# Patient Record
Sex: FEMALE | Race: WHITE | NOT HISPANIC OR LATINO | Employment: FULL TIME | URBAN - METROPOLITAN AREA
[De-identification: names, ages, dates, MRNs, and addresses within clinical notes are randomized per-mention and may not be internally consistent; named-entity substitution may affect disease eponyms.]

---

## 2017-04-24 ENCOUNTER — HOSPITAL ENCOUNTER (OUTPATIENT)
Dept: RADIOLOGY | Facility: MEDICAL CENTER | Age: 32
End: 2017-04-24
Attending: NURSE PRACTITIONER
Payer: COMMERCIAL

## 2017-04-24 DIAGNOSIS — N63.0 LUMP OR MASS IN BREAST: ICD-10-CM

## 2017-04-24 PROCEDURE — 76642 ULTRASOUND BREAST LIMITED: CPT | Mod: LT

## 2017-04-24 PROCEDURE — G0204 DX MAMMO INCL CAD BI: HCPCS

## 2019-08-05 ENCOUNTER — OFFICE VISIT (OUTPATIENT)
Dept: URGENT CARE | Facility: PHYSICIAN GROUP | Age: 34
End: 2019-08-05
Payer: COMMERCIAL

## 2019-08-05 VITALS
WEIGHT: 140 LBS | TEMPERATURE: 98.1 F | SYSTOLIC BLOOD PRESSURE: 110 MMHG | DIASTOLIC BLOOD PRESSURE: 78 MMHG | HEART RATE: 90 BPM | OXYGEN SATURATION: 97 % | RESPIRATION RATE: 16 BRPM
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
## 2019-08-05 PROCEDURE — 99203 OFFICE O/P NEW LOW 30 MIN: CPT | Performed by: PHYSICIAN ASSISTANT

## 2019-08-05 RX ORDER — SULFAMETHOXAZOLE AND TRIMETHOPRIM 800; 160 MG/1; MG/1
1 TABLET ORAL 2 TIMES DAILY
Qty: 14 TAB | Refills: 0 | Status: SHIPPED | OUTPATIENT
Start: 2019-08-05 | End: 2019-08-12

## 2019-08-05 RX ORDER — TRIAMCINOLONE ACETONIDE 1 MG/G
CREAM TOPICAL
Qty: 1 TUBE | Refills: 0 | Status: SHIPPED | OUTPATIENT
Start: 2019-08-05

## 2019-08-05 SDOH — HEALTH STABILITY: MENTAL HEALTH: HOW MANY STANDARD DRINKS CONTAINING ALCOHOL DO YOU HAVE ON A TYPICAL DAY?: 1 OR 2

## 2019-08-05 SDOH — HEALTH STABILITY: MENTAL HEALTH: HOW OFTEN DO YOU HAVE A DRINK CONTAINING ALCOHOL?: MONTHLY OR LESS

## 2019-08-05 ASSESSMENT — ENCOUNTER SYMPTOMS
FEVER: 0
COUGH: 0
PALPITATIONS: 0
SHORTNESS OF BREATH: 0

## 2019-08-05 ASSESSMENT — PAIN SCALES - GENERAL: PAINLEVEL: 3=SLIGHT PAIN

## 2019-08-06 NOTE — PROGRESS NOTES
Subjective:      Ilda Snowden is a 34 y.o. female who presents with Insect Bite (R ankle )            Rash   This is a new problem. The current episode started in the past 7 days. The problem has been gradually improving since onset. Location: right ankle. The rash is characterized by blistering, redness, itchiness and pain. She was exposed to an insect bite/sting. Pertinent negatives include no cough, fever or shortness of breath. Past treatments include anti-itch cream. The treatment provided no relief.       Review of Systems   Constitutional: Negative for fever and malaise/fatigue.   Respiratory: Negative for cough and shortness of breath.    Cardiovascular: Negative for chest pain and palpitations.   Skin: Positive for itching and rash.   All other systems reviewed and are negative.     PMH:  has no past medical history on file.  MEDS:   Current Outpatient Medications:   •  sulfamethoxazole-trimethoprim (BACTRIM DS) 800-160 MG tablet, Take 1 Tab by mouth 2 times a day for 7 days., Disp: 14 Tab, Rfl: 0  •  triamcinolone acetonide (KENALOG) 0.1 % Cream, Apply to affected area twice daily for 10 days, Disp: 1 Tube, Rfl: 0  •  albuterol (VENTOLIN OR PROVENTIL) 108 (90 BASE) MCG/ACT AERS, Inhale 2 Puffs by mouth every 6 hours as needed for Shortness of Breath., Disp: 1 Inhaler, Rfl: 3  ALLERGIES: No Known Allergies  SURGHX: History reviewed. No pertinent surgical history.  SOCHX:  reports that she has never smoked. She has never used smokeless tobacco. She reports that she drinks about 1.8 oz of alcohol per week. She reports that she does not use drugs.  FH: Family history was reviewed, no pertinent findings to report       Objective:     /78   Pulse 90   Temp 36.7 °C (98.1 °F) (Temporal)   Resp 16   Wt 63.5 kg (140 lb)   SpO2 97%      Physical Exam   Constitutional: She appears well-developed and well-nourished.   Cardiovascular: Normal rate, regular rhythm and normal heart sounds.    Pulmonary/Chest: Effort normal and breath sounds normal.   Skin: Skin is warm and dry. Rash noted. There is erythema.   3 cm area of indurated erythema of medial right ankle.   Psychiatric: She has a normal mood and affect. Her behavior is normal. Judgment and thought content normal.   Vitals reviewed.              Assessment/Plan:     1. Bug bite with infection, initial encounter    - sulfamethoxazole-trimethoprim (BACTRIM DS) 800-160 MG tablet; Take 1 Tab by mouth 2 times a day for 7 days.  Dispense: 14 Tab; Refill: 0  - triamcinolone acetonide (KENALOG) 0.1 % Cream; Apply to affected area twice daily for 10 days  Dispense: 1 Tube; Refill: 0    Differential diagnosis, natural history, supportive care discussed. Follow-up with primary care provider within 7-10 days, emergency room precautions discussed.  Patient and/or family appears understanding of information.  Handout and review of patients diagnosis and treatment was discussed extensively.

## 2022-04-20 ENCOUNTER — OFFICE VISIT (OUTPATIENT)
Dept: URGENT CARE | Facility: PHYSICIAN GROUP | Age: 37
End: 2022-04-20
Payer: COMMERCIAL

## 2022-04-20 VITALS
TEMPERATURE: 97 F | DIASTOLIC BLOOD PRESSURE: 86 MMHG | HEIGHT: 64 IN | WEIGHT: 124 LBS | HEART RATE: 98 BPM | BODY MASS INDEX: 21.17 KG/M2 | RESPIRATION RATE: 12 BRPM | OXYGEN SATURATION: 99 % | SYSTOLIC BLOOD PRESSURE: 126 MMHG

## 2022-04-20 DIAGNOSIS — K04.7 DENTAL INFECTION: ICD-10-CM

## 2022-04-20 PROCEDURE — 99213 OFFICE O/P EST LOW 20 MIN: CPT | Performed by: NURSE PRACTITIONER

## 2022-04-20 RX ORDER — ACETAMINOPHEN 325 MG/1
650 TABLET ORAL EVERY 4 HOURS PRN
COMMUNITY

## 2022-04-20 RX ORDER — CHLORHEXIDINE GLUCONATE ORAL RINSE 1.2 MG/ML
5 SOLUTION DENTAL 2 TIMES DAILY
Qty: 118 ML | Refills: 0 | Status: SHIPPED | OUTPATIENT
Start: 2022-04-20 | End: 2022-04-27

## 2022-04-20 RX ORDER — IBUPROFEN 400 MG/1
400 TABLET ORAL EVERY 6 HOURS PRN
COMMUNITY

## 2022-04-20 RX ORDER — AMOXICILLIN 500 MG/1
500 CAPSULE ORAL 2 TIMES DAILY
Qty: 14 CAPSULE | Refills: 0 | Status: SHIPPED | OUTPATIENT
Start: 2022-04-20 | End: 2022-04-27

## 2022-04-20 ASSESSMENT — ENCOUNTER SYMPTOMS
HEADACHES: 0
NAUSEA: 0
VOMITING: 0
CHILLS: 0
FEVER: 0
MYALGIAS: 1

## 2022-04-20 ASSESSMENT — PAIN SCALES - GENERAL: PAINLEVEL: 10=SEVERE PAIN

## 2022-04-20 NOTE — PROGRESS NOTES
"Subjective:     Ilda Snowden is a 37 y.o. female who presents for Dental Pain (\"Has an apt with dentist next week\")      HPI  Pt presents for evaluation of a new problem. Ilda is a pleasant 37-year-old female presents to urgent care today with complaints of right upper posterior gum pain that has been ongoing for the past 4 weeks.  She notes that this pain started approximately 1 week after having her dental cleaning.  During her last dental cleaning she notes that the dental hygienist did \" gouge\" her back gum where this pain is now presenting.  Her pain is intermittent and will last approximately 15 to 20 minutes.  This pain is sharp and radiates up and down gumline before dissipating.  She has been alternating between Tylenol and ibuprofen however, this does not provide any relief of her discomfort.  Her pain gets up to an 8 out of 10.  She denies any fever, chills, drainage, nausea or vomiting.  She also denies any increased sensitivity to hot and cold.    Review of Systems   Constitutional: Negative for chills and fever.   Gastrointestinal: Negative for nausea and vomiting.   Musculoskeletal: Positive for myalgias.   Neurological: Negative for headaches.       PMH: History reviewed. No pertinent past medical history.  ALLERGIES: No Known Allergies  SURGHX: History reviewed. No pertinent surgical history.  SOCHX:   Social History     Socioeconomic History   • Marital status:    Tobacco Use   • Smoking status: Never Smoker   • Smokeless tobacco: Never Used   Substance and Sexual Activity   • Alcohol use: Yes     Alcohol/week: 1.8 oz     Types: 1 Cans of beer, 1 Shots of liquor, 1 Glasses of wine per week   • Drug use: Never     FH: History reviewed. No pertinent family history.      Objective:   /86   Pulse 98   Temp 36.1 °C (97 °F)   Resp 12   Ht 1.626 m (5' 4\")   Wt 56.2 kg (124 lb) Comment: wth shoes  SpO2 99%   BMI 21.28 kg/m²     Physical Exam  Vitals and nursing note " reviewed.   Constitutional:       General: She is not in acute distress.     Appearance: Normal appearance. She is not ill-appearing.   HENT:      Head: Normocephalic and atraumatic.      Right Ear: External ear normal.      Left Ear: External ear normal.      Nose: No congestion or rhinorrhea.      Mouth/Throat:      Mouth: Mucous membranes are moist.      Dentition: Does not have dentures. Dental tenderness and gingival swelling present. No dental caries, dental abscesses or gum lesions.        Comments: Positive for erythema and swelling of right second and third molar.   Eyes:      Extraocular Movements: Extraocular movements intact.      Pupils: Pupils are equal, round, and reactive to light.   Cardiovascular:      Rate and Rhythm: Normal rate and regular rhythm.      Pulses: Normal pulses.      Heart sounds: Normal heart sounds.   Pulmonary:      Effort: Pulmonary effort is normal. No respiratory distress.      Breath sounds: Normal breath sounds. No stridor. No wheezing, rhonchi or rales.   Chest:      Chest wall: No tenderness.   Abdominal:      General: Abdomen is flat. Bowel sounds are normal.      Palpations: Abdomen is soft.      Tenderness: There is no abdominal tenderness. There is no right CVA tenderness or left CVA tenderness.   Musculoskeletal:         General: Normal range of motion.      Cervical back: Normal range of motion and neck supple.   Skin:     General: Skin is warm and dry.      Capillary Refill: Capillary refill takes less than 2 seconds.   Neurological:      General: No focal deficit present.      Mental Status: She is alert and oriented to person, place, and time. Mental status is at baseline.   Psychiatric:         Mood and Affect: Mood normal.         Behavior: Behavior normal.         Thought Content: Thought content normal.         Judgment: Judgment normal.         Assessment/Plan:   Assessment    1. Dental infection  amoxicillin (AMOXIL) 500 MG Cap    chlorhexidine (PERIDEX)  0.12 % Solution     She will be treated for dental infection with amoxicillin and Peridex mouth solution.  Patient does have follow-up appointment with dentist in 7 days.  Continue with over-the-counter ibuprofen and Tylenol for relief of pain.  She may also use Orajel topically.  Follow-up for worsening or persistent symptoms.  AVS handout given and reviewed with patient. Pt educated on red flags and when to seek treatment back in ER or UC.

## 2023-07-22 ENCOUNTER — OFFICE VISIT (OUTPATIENT)
Dept: URGENT CARE | Facility: PHYSICIAN GROUP | Age: 38
End: 2023-07-22
Payer: COMMERCIAL

## 2023-07-22 VITALS
TEMPERATURE: 98.2 F | OXYGEN SATURATION: 96 % | DIASTOLIC BLOOD PRESSURE: 82 MMHG | HEART RATE: 69 BPM | HEIGHT: 63 IN | BODY MASS INDEX: 23.35 KG/M2 | RESPIRATION RATE: 14 BRPM | WEIGHT: 131.8 LBS | SYSTOLIC BLOOD PRESSURE: 118 MMHG

## 2023-07-22 DIAGNOSIS — J02.9 SORE THROAT: ICD-10-CM

## 2023-07-22 DIAGNOSIS — R59.1 LYMPHADENOPATHY: ICD-10-CM

## 2023-07-22 DIAGNOSIS — H60.502 ACUTE OTITIS EXTERNA OF LEFT EAR, UNSPECIFIED TYPE: ICD-10-CM

## 2023-07-22 LAB — S PYO DNA SPEC NAA+PROBE: NOT DETECTED

## 2023-07-22 PROCEDURE — 3074F SYST BP LT 130 MM HG: CPT

## 2023-07-22 PROCEDURE — 87651 STREP A DNA AMP PROBE: CPT

## 2023-07-22 PROCEDURE — 3079F DIAST BP 80-89 MM HG: CPT

## 2023-07-22 PROCEDURE — 99213 OFFICE O/P EST LOW 20 MIN: CPT

## 2023-07-22 RX ORDER — CIPROFLOXACIN AND DEXAMETHASONE 3; 1 MG/ML; MG/ML
4 SUSPENSION/ DROPS AURICULAR (OTIC) 2 TIMES DAILY
Qty: 2.8 ML | Refills: 0 | Status: SHIPPED | OUTPATIENT
Start: 2023-07-22 | End: 2023-07-29

## 2023-07-22 NOTE — PROGRESS NOTES
"Subjective     Ilda Snowden is a 38 y.o. female who presents with Sore Throat (X 2 days with hard time swallowing.  Denies fever or chills. )            HPI patient states she was recently on vacation [place], living, swimming.  States she has been home for almost a week now.  She woke up with a sore throat 2 days ago.  She states that it is painful to swallow, she feels swollen on the left side worse than the right  She states she had some nausea yesterday but no vomiting.  She denies any fevers or chills.   She has used some OTC ibuprofen which does help.      ROS same as above     No Known Allergies    Current Outpatient Medications   Medication Sig    ciprofloxacin/dexamethasone (CIPRODEX) 0.3-0.1 % Suspension Administer 4 Drops into affected ear(s) 2 times a day for 7 days.    acetaminophen (TYLENOL) 325 MG Tab Take 650 mg by mouth every four hours as needed.    ibuprofen (MOTRIN) 400 MG Tab Take 400 mg by mouth every 6 hours as needed.    triamcinolone acetonide (KENALOG) 0.1 % Cream Apply to affected area twice daily for 10 days (Patient not taking: Reported on 4/20/2022)    albuterol (VENTOLIN OR PROVENTIL) 108 (90 BASE) MCG/ACT AERS Inhale 2 Puffs by mouth every 6 hours as needed for Shortness of Breath. (Patient not taking: Reported on 4/20/2022)        No past medical history on file.         Objective     /82   Pulse 69   Temp 36.8 °C (98.2 °F) (Temporal)   Resp 14   Ht 1.6 m (5' 3\")   Wt 59.8 kg (131 lb 12.8 oz)   LMP 07/18/2023   SpO2 96%   BMI 23.35 kg/m²      Physical Exam  Constitutional:       Appearance: Normal appearance. She is not ill-appearing.   HENT:      Head: Normocephalic and atraumatic.      Right Ear: Tympanic membrane, ear canal and external ear normal.      Left Ear: Tympanic membrane and external ear normal.      Ears:      Comments: Erythema, tenderness to left ear canal     Nose: Nose normal.      Mouth/Throat:      Mouth: Mucous membranes are moist.      " Pharynx: Uvula midline. Posterior oropharyngeal erythema present. No pharyngeal swelling, oropharyngeal exudate or uvula swelling.      Tonsils: No tonsillar exudate or tonsillar abscesses.   Eyes:      Pupils: Pupils are equal, round, and reactive to light.   Cardiovascular:      Rate and Rhythm: Normal rate and regular rhythm.      Heart sounds: Normal heart sounds.   Pulmonary:      Effort: Pulmonary effort is normal. No respiratory distress.      Breath sounds: No stridor. No wheezing or rhonchi.   Abdominal:      Palpations: Abdomen is soft.   Musculoskeletal:         General: Normal range of motion.      Cervical back: Normal range of motion and neck supple.   Lymphadenopathy:      Head:      Right side of head: No submental, submandibular, tonsillar, preauricular or posterior auricular adenopathy.      Left side of head: Submandibular adenopathy present. No submental, tonsillar, preauricular or posterior auricular adenopathy.      Cervical: Cervical adenopathy present.      Right cervical: Superficial cervical adenopathy present. No deep or posterior cervical adenopathy.     Left cervical: Superficial cervical adenopathy present. No deep or posterior cervical adenopathy.   Skin:     General: Skin is warm and dry.   Neurological:      Mental Status: She is alert and oriented to person, place, and time.                             Assessment & Plan      Patient presents today due to several days of sore throat, with painful swallowing.  She states she feels that the left side is worse than the right.  She denies any associated fevers, she was slightly nauseous yesterday.  She states she has had no vomiting.  She denies any ear pain or drainage.          On exam her lung sounds are clear, no wheezing no rhonchi, SPO2 96% in clinic today.  Heart sounds are normal, regular rhythm.  There is erythema with slight tenderness to left ear canal.  There is left-sided submandibular and superficial cervical lymph  adenopathy, she has right superficial cervical adenopathy.  There is erythema in the posterior pharynx, uvula remains midline, no exudates no swelling.  Discussed otitis externa of left ear.  Drops to be called in to pharmacy of choice.  Discussed point-of-care strep test in clinic today, patient agreeable.  Discussed OTC Tylenol, ibuprofen for symptom management.  Warm salt water gargles, humidifier or hot showers.  Discussed if strep test is positive it antibiotics will be called into pharmacy and she will be updated via [facility].  Discussed if she is positive for strep that she needs to refrain from social activities until she has been on antibiotics for 24 hours.  Patient advised to obtain a new toothbrush after being on antibiotics for 24 hours.  Patient agreeable with plan of care  Differential diagnosis, natural history, supportive care, and indications for immediate follow-up were discussed.      1. Sore throat  POCT CEPHEID GROUP A STREP - PCR      2. Lymphadenopathy  POCT CEPHEID GROUP A STREP - PCR      3. Acute otitis externa of left ear, unspecified type  ciprofloxacin/dexamethasone (CIPRODEX) 0.3-0.1 % Suspension        Strep negative in clinic today-updated pt via [facility]

## 2023-09-01 ENCOUNTER — APPOINTMENT (OUTPATIENT)
Dept: URGENT CARE | Facility: PHYSICIAN GROUP | Age: 38
End: 2023-09-01
Payer: COMMERCIAL

## 2023-10-13 ENCOUNTER — APPOINTMENT (OUTPATIENT)
Dept: RADIOLOGY | Facility: MEDICAL CENTER | Age: 38
End: 2023-10-13
Attending: EMERGENCY MEDICINE
Payer: COMMERCIAL

## 2023-10-13 ENCOUNTER — HOSPITAL ENCOUNTER (EMERGENCY)
Facility: MEDICAL CENTER | Age: 38
End: 2023-10-14
Attending: STUDENT IN AN ORGANIZED HEALTH CARE EDUCATION/TRAINING PROGRAM
Payer: COMMERCIAL

## 2023-10-13 ENCOUNTER — APPOINTMENT (OUTPATIENT)
Dept: RADIOLOGY | Facility: MEDICAL CENTER | Age: 38
End: 2023-10-13
Attending: STUDENT IN AN ORGANIZED HEALTH CARE EDUCATION/TRAINING PROGRAM
Payer: COMMERCIAL

## 2023-10-13 ENCOUNTER — HOSPITAL ENCOUNTER (EMERGENCY)
Facility: MEDICAL CENTER | Age: 38
End: 2023-10-13
Attending: EMERGENCY MEDICINE
Payer: COMMERCIAL

## 2023-10-13 VITALS
DIASTOLIC BLOOD PRESSURE: 56 MMHG | OXYGEN SATURATION: 95 % | HEART RATE: 65 BPM | RESPIRATION RATE: 16 BRPM | HEIGHT: 63 IN | WEIGHT: 135 LBS | BODY MASS INDEX: 23.92 KG/M2 | TEMPERATURE: 98.9 F | SYSTOLIC BLOOD PRESSURE: 114 MMHG

## 2023-10-13 DIAGNOSIS — M54.17 LUMBOSACRAL RADICULOPATHY: ICD-10-CM

## 2023-10-13 DIAGNOSIS — M51.26 LUMBAR DISC HERNIATION: ICD-10-CM

## 2023-10-13 DIAGNOSIS — M54.41 ACUTE RIGHT-SIDED LOW BACK PAIN WITH RIGHT-SIDED SCIATICA: ICD-10-CM

## 2023-10-13 LAB — HCG UR QL: NEGATIVE

## 2023-10-13 PROCEDURE — 72170 X-RAY EXAM OF PELVIS: CPT

## 2023-10-13 PROCEDURE — 72100 X-RAY EXAM L-S SPINE 2/3 VWS: CPT

## 2023-10-13 PROCEDURE — 81025 URINE PREGNANCY TEST: CPT

## 2023-10-13 PROCEDURE — 99283 EMERGENCY DEPT VISIT LOW MDM: CPT

## 2023-10-13 PROCEDURE — 96372 THER/PROPH/DIAG INJ SC/IM: CPT

## 2023-10-13 PROCEDURE — 72148 MRI LUMBAR SPINE W/O DYE: CPT

## 2023-10-13 PROCEDURE — 700111 HCHG RX REV CODE 636 W/ 250 OVERRIDE (IP): Performed by: STUDENT IN AN ORGANIZED HEALTH CARE EDUCATION/TRAINING PROGRAM

## 2023-10-13 RX ORDER — DEXAMETHASONE SODIUM PHOSPHATE 4 MG/ML
10 INJECTION, SOLUTION INTRA-ARTICULAR; INTRALESIONAL; INTRAMUSCULAR; INTRAVENOUS; SOFT TISSUE ONCE
Status: COMPLETED | OUTPATIENT
Start: 2023-10-13 | End: 2023-10-13

## 2023-10-13 RX ORDER — METHYLPREDNISOLONE 4 MG/1
TABLET ORAL
Qty: 1 EACH | Refills: 0 | Status: SHIPPED | OUTPATIENT
Start: 2023-10-13

## 2023-10-13 RX ADMIN — DEXAMETHASONE SODIUM PHOSPHATE 10 MG: 4 INJECTION INTRA-ARTICULAR; INTRALESIONAL; INTRAMUSCULAR; INTRAVENOUS; SOFT TISSUE at 22:32

## 2023-10-13 ASSESSMENT — PAIN DESCRIPTION - PAIN TYPE: TYPE: ACUTE PAIN

## 2023-10-13 NOTE — ED NOTES
"Rounded on patient, she reports back \"let go\". Reports pain has decreased to 2/10. Resting calmly on gurney with call light within reach.   "

## 2023-10-13 NOTE — ED PROVIDER NOTES
Emergency Physician Note    Chief Concern:  Chief Complaint   Patient presents with    Low Back Pain     BIB [facility] for 10/10 sharp, shooting and constant pain in low back. Pt states new numbness to posterior RLE. Pt received 250 mcg fent, 15 mg of ketamine, and 4 mg of zofran en route. Pt crying with 10/10 pain on arrival.        HPI/ROS   Outside Historians:  Patient was brought to the emergency department by EMS. No written documentation, including medication administration record, was provided at time of handoff.  No run sheet was provided at time of handoff.     External Records Reviewed:  Outpatient Notes Ms. Snowden was seen in outpatient clinic 7/22/2023.  APRN note reviewed from that visit.  She was seen for evaluation of sore throat.  She was diagnosed with acute otitis externa, discharged on Ciprodex.  Point-of-care group A strep test was negative.  HPI:  Ilda Snowden is a 38 y.o. female who presents to the emergency department today for evaluation of right-sided back pain with radiation throughout the right leg.  She states that she has had intermittent symptoms that were similar but very rare.  Over the last 2 weeks she has had progressively increasing pain, as well as frequency of symptoms.  She states that the pain originates to the right side of her lumbar spine, then radiates throughout the right gluteal region, along the right leg, and is involved with diminished sensation.  She has no associated rashes or lesions, no fevers, she states that walking seems neither exacerbated nor alleviated her symptoms.  She states that typically her symptoms will improve with pacing or walking over the course of the day, but seem to occur in the morning.  This morning she woke up and her symptoms were more severe than they previously been prompting her to come to the emergency department, however on my initial assessment she states that the pain has let up, and she is now feeling quite well.  She has not  "noticed any urinary symptoms, no hematuria, pain does not seem to be located to the flank, no radiation through the flank area.  She reports no significant past medical history.    PAST MEDICAL HISTORY  History reviewed. No pertinent past medical history.    SURGICAL HISTORY  History reviewed. No pertinent surgical history.    FAMILY HISTORY  History reviewed. No pertinent family history.    SOCIAL HISTORY   reports that she has never smoked. She has never used smokeless tobacco. She reports current alcohol use of about 9.6 oz of alcohol per week. She reports that she does not use drugs.    CURRENT MEDICATIONS  Previous Medications    ACETAMINOPHEN (TYLENOL) 325 MG TAB    Take 650 mg by mouth every four hours as needed.    ALBUTEROL (VENTOLIN OR PROVENTIL) 108 (90 BASE) MCG/ACT AERS    Inhale 2 Puffs by mouth every 6 hours as needed for Shortness of Breath.    IBUPROFEN (MOTRIN) 400 MG TAB    Take 400 mg by mouth every 6 hours as needed.    TRIAMCINOLONE ACETONIDE (KENALOG) 0.1 % CREAM    Apply to affected area twice daily for 10 days       ALLERGIES  Patient has no known allergies.    PHYSICAL EXAM  Vital Signs: /80   Pulse 68   Temp 36.6 °C (97.9 °F) (Temporal)   Resp 14   Ht 1.6 m (5' 3\")   Wt 61.2 kg (135 lb)   SpO2 96%   BMI 23.91 kg/m²   Constitutional: Alert, no acute distress  HENT: Atraumatic  Neck: Normal range of motion  Cardiovascular: Right lower extremity warm, well perfused  Pulmonary: Normal work of breathing  Skin: Right lower extremity with normal appearing overlying skin, no redness, no cellulitis, no evidence of abscess, no lacerations nor abrasions  Musculoskeletal: She has minimal discomfort on palpation of the right lumbar paraspinous musculature, no bony midline tenderness to palpation along the lumbar spine.  She is not having any active pain on my initial assessment, no pain reproduced on palpation of the gluteal region, or lower leg.  She does have 5 out of 5 bilateral lower " extremity strength, no ataxia, he is easily able to ambulate with a normal gait.  Neurologic: Sensory function present throughout the right lower extremity, though sensation feels decreased as compared to the left.       Diagnostic Studies & Procedures    Labs:  All labs reviewed by me as noted below.    Radiology:  The attending Emergency Physician has independently interpreted the following imaging:  I independently interpreted the plain films of the lumbar spine, no pathologic lesions or compression fractures identified.    DX-LUMBAR SPINE-2 OR 3 VIEWS   Final Result      1.  Disc degenerative disease at L5-S1.   2.  Lower lumbar spine predominant facet joint degenerative changes.   3.  No acute fracture seen.      DX-PELVIS-1 OR 2 VIEWS   Final Result      No acute fracture or dislocation.          Course and Medical Decision Making    ED Observation Status? No; Patient does not meet criteria for ED Observation.       Initial Assessment and Plan    Ms. Snowden presents to the emergency department today for evaluation of right-sided back pain and gluteal pain as documented above.  She has no evidence of neurovascular compromise on physical exam, no neurologic deficit, no red flag symptoms concerning for cauda equina syndrome.  Pain has actually resolved on arrival.  She has no pain out of proportion to exam.  Vital signs are all reassuring without evidence of Sirs or sepsis.     Plain film of the lumbar spine demonstrates degenerative disc disease at L5-S1.  Generative changes seen in the lower spine.    She remained well-appearing in the emergency department without any new or worsening symptoms.  At this time, plan is for discharge home with close primary care follow-up.  I provided a prescription for a Medrol Dosepak to see if this helps alleviate her symptoms.  She may require physical therapy, further evaluation, or outpatient MRI imaging, at this time I believe in absence of findings concerning for spinal  cord injury she is safe for discharge home and outpatient follow-up. Return precautions were discussed with the patient, and provided in written form with the patient's discharge instructions.     Additional Problems and Disposition    Escalation of care considered, and ultimately not performed:   1.  Advanced imaging -on arrival to the emergency department initially considered emergent MRI imaging, however she has no lower extremity weakness, no saddle anesthesia, no bowel or bladder dysfunction, no midline back pain.  History and physical exam are less concerning for cauda equina syndrome.  Symptoms seem more consistent with sciatica, no evidence of acute spinal cord injury requiring emergent neurosurgical intervention.  Believe she is stable for discharge home with close outpatient follow-up, advanced imaging on an outpatient basis if necessary, with good return precautions.    Barriers to care at this time, including but not limited to:  None known at this time .     Decision tools and prescription drugs considered including, but not limited to:   Opiate pain medications -initially considered opiate pain medications, however no acute processes identified.  Suspect this may be a more chronic condition such as sciatica, believe the risks of opiate pain medications outweigh the benefits.  First-line therapy include anti-inflammatory medications and over-the-counter analgesics, patient will be discharged with a steroid Dosepak.    Disposition:  The patient will return for new or worsening symptoms and is stable at the time of discharge.    Patient will be discharged home in stable condition.    FOLLOW UP:  Kane Chirinos M.D.  [address]  [facility] [address]  [facility] [id number] [phone]    Schedule an appointment as soon as possible for a visit       [facility], Emergency Dept  [address] [facility] [address] [phone]  Go to   If symptoms worsen      OUTPATIENT MEDICATIONS:  New  Prescriptions    METHYLPREDNISOLONE (MEDROL DOSEPAK) 4 MG TABLET THERAPY PACK    Use as directed         FINAL IMPRESSION   1. Acute right-sided low back pain with right-sided sciatica      Yahir BLOOD (Scribe), am scribing for, and in the presence of, Leatha Ibrahim M.D..    Electronically signed by: Yahir Murrell ([email or web address]), 10/13/2023    ILeatha M.D. personally performed the services described in this documentation, as scribed by Yahir Murrell in my presence, and it is both accurate and complete.     The note accurately reflects work and decisions made by me.  Leatha Ibrahim M.D.  10/14/2023  12:17 PM

## 2023-10-13 NOTE — ED TRIAGE NOTES
Ildarowena Coronel Sp  38 y.o. female  Chief Complaint   Patient presents with    Low Back Pain     BIB [facility] for 10/10 sharp, shooting and constant pain in low back. Pt states new numbness to posterior RLE. Pt received 250 mcg fent, 15 mg of ketamine, and 4 mg of zofran en route. Pt crying with 10/10 pain on arrival.      Pt BIB EMS for above complaint.    Pt is GCS 15, speaking in full sentences, follows commands and responds appropriately to questions. Resp are even and unlabored.       Vitals:    10/13/23 1342   BP: (!) 197/108   Pulse: 66   Resp: 20   Temp: 36.6 °C (97.9 °F)   SpO2: 100%

## 2023-10-13 NOTE — DISCHARGE INSTRUCTIONS
Please follow-up with your primary care clinic for complete recheck.  You may require physical therapy, or possibly further pain management.  Additionally, MRI may be necessary to definitively diagnose the cause of your pain.  Return to the emergency department immediately if you develop any new or worsening symptoms, especially if you have weakness of the legs, numbness or tingling, urinary incontinence, or if you have any further concerns.

## 2023-10-14 VITALS
SYSTOLIC BLOOD PRESSURE: 119 MMHG | OXYGEN SATURATION: 95 % | RESPIRATION RATE: 20 BRPM | HEART RATE: 69 BPM | BODY MASS INDEX: 25.08 KG/M2 | TEMPERATURE: 97.2 F | HEIGHT: 63 IN | DIASTOLIC BLOOD PRESSURE: 67 MMHG | WEIGHT: 141.54 LBS

## 2023-10-14 NOTE — ED PROVIDER NOTES
ED Provider Note    CHIEF COMPLAINT  Chief Complaint   Patient presents with    Leg Pain     Pt having excruciating R leg pain. Pt diagnosed with sciatica today here in ED. Pt numbness progressed to pain.        EXTERNAL RECORDS REVIEWED  Outpatient Notes office visit on 4/13/2014 for forearm tendinitis    HPI/ROS  LIMITATION TO HISTORY   Select: : None  OUTSIDE HISTORIAN(S):      Ilda Snowden is a 38 y.o. female who presents with severe radiating pain from the lower lumbar spine to the right lower extremity.  Patient also reports difficulty ambulating and numbness on the lateral aspect of the right lower extremity distally.  Patient reports this is why she came back to the hospital after being discharged earlier today.  Patient also reports that now that she has been sitting on her hospital bed the pain has improved but she remains with persistent numbness and can barely feel the lateral aspect of her right lower extremity.  Patient denies recent trauma.  Patient reports that she suffered an injury to the back while lifting a heavy object several years ago which flared up this summer while [place] and then worsened again 2 weeks ago.  Patient reports that the pain was excruciating and caused her to come to the hospital.  Patient denies recent trauma, saddle anesthesia, bowel or bladder incontinence, urinary retention.  Patient does endorse weakness of the right lower extremity as well.    PAST MEDICAL HISTORY       SURGICAL HISTORY  patient denies any surgical history    FAMILY HISTORY  History reviewed. No pertinent family history.    SOCIAL HISTORY  Social History     Tobacco Use    Smoking status: Never    Smokeless tobacco: Never   Vaping Use    Vaping Use: Never used   Substance and Sexual Activity    Alcohol use: Yes     Alcohol/week: 9.6 oz     Types: 14 Glasses of wine, 1 Cans of beer, 1 Shots of liquor per week     Comment: 3 glasses wine per day    Drug use: Never    Sexual  "activity: Not on file       CURRENT MEDICATIONS  Home Medications       Reviewed by Lela Steven R.N. (Registered Nurse) on 10/13/23 at 2114  Med List Status: Not Addressed     Medication Last Dose Status   acetaminophen (TYLENOL) 325 MG Tab  Active   albuterol (VENTOLIN OR PROVENTIL) 108 (90 BASE) MCG/ACT AERS  Active   ibuprofen (MOTRIN) 400 MG Tab  Active   methylPREDNISolone (MEDROL DOSEPAK) 4 MG Tablet Therapy Pack  Active   triamcinolone acetonide (KENALOG) 0.1 % Cream  Active                    ALLERGIES  No Known Allergies    PHYSICAL EXAM  VITAL SIGNS: /57   Pulse (!) 58   Temp 36.6 °C (97.8 °F) (Temporal)   Resp 20   Ht 1.6 m (5' 3\")   Wt 64.2 kg (141 lb 8.6 oz)   SpO2 92%   BMI 25.07 kg/m²    Vitals and nursing note reviewed.   Constitutional:       Comments: Patient is lying in bed supine, pleasant, conversant, speaking in complete sentences   HENT:      Head: Normocephalic and atraumatic.   Eyes:      Extraocular Movements: Extraocular movements intact.      Conjunctiva/sclera: Conjunctivae normal.      Pupils: Pupils are equal, round, and reactive to light.   Cardiovascular:      Pulses: Normal pulses.      Comments: HR 71  Pulmonary:      Effort: Pulmonary effort is normal. No respiratory distress.   Musculoskeletal:      No midline C/T/L-spine tenderness to palpation, step-offs or deformities     General: No swelling. Normal range of motion.      Cervical back: Normal range of motion. No rigidity.   Skin:     General: Skin is warm and dry.      Capillary Refill: Capillary refill takes less than 2 seconds.   Neurological:      Mental Status: Alert.  Dorsiflexion weak on the right compared to the left, 4-5.  Patient has decreased sensation to light touch in the lateral aspect of the right foot.  Hip flexion 5 out of 5 bilaterally.      DIAGNOSTIC STUDIES / PROCEDURES    RADIOLOGY  I have independently interpreted the diagnostic imaging associated with this visit and am waiting the " final reading from the radiologist.   My preliminary interpretation is as follows: L5-S1 disc herniation  Radiologist interpretation: Right subarticular disc extrusion at the L5-S1 level effacing the right lateral recess and impinging on the exiting nerve roots    COURSE & MEDICAL DECISION MAKING    ED Observation Status? Yes; I am placing the patient in to an observation status due to a diagnostic uncertainty as well as therapeutic intensity. Patient placed in observation status at 9:33 PM, 10/13/2023.     Observation plan is as follows: Pending MRI    Upon Reevaluation, the patient's condition has: Improved; and will be discharged.    Patient discharged from ED Observation status at 1:10 AM (Time) 10/14/2023 (Date).     INITIAL ASSESSMENT, COURSE AND PLAN  Care Narrative: I cannot rule out cauda equina or spinal compression at this time given patient's subjective weakness and numbness on exam.  Dexamethasone has been ordered.  Patient requires emergent MRI to rule out cauda equina syndrome versus spinal cord compression.  Earlier my colleague performed x-ray imaging menstruated degenerative changes of the lower lumbar spine facet and disc degeneration at L5-S1.  No fractures are identified.  Disposition pending MRI imaging.    Electronically signed by: Alan Sanchez M.D., 10/13/2023 10:24 PM    Patient without spinal cord compression or cauda equina syndrome.  RI lumbar spine demonstrates S1 nerve root compression likely causing the patient's symptoms.  Patient able to ambulate.  Patient reports pain is improved following Decadron.  Patient has already been prescribed a Medrol Dosepak and counseled regarding red flag symptoms for spinal cord compression.  Patient has been given referral to spine surgery with Dr. Storm.    Repeat physical exam benign.  I doubt any serious emergency process at this time.  Patient and/or family, friends given strict return precautions for worsening symptoms and care  instructions. They have demonstrated understanding of discharge instructions through teach back mechanism. Advised PCP follow-up in 1-2 days.  Patient/family/friend expresses understanding and agrees to plan.    This dictation has been created using voice recognition software. I am continuously working with the software to minimize the number of voice recognition errors and I have made every attempt to manually correct the errors within my dictation. However errors  related to this voice recognition software may still exist and should be interpreted within the appropriate context.     Electronically signed by: Alan Sanchez M.D., 10/14/2023 1:13 AM        DISPOSITION AND DISCUSSIONS    Decision tools and prescription drugs considered including, but not limited to: Pain Medications   over-the-counter pain medications are appropriate, narcotics not indicated at this time  .    FINAL DIAGNOSIS  1. Lumbar disc herniation    2. Lumbosacral radiculopathy           Electronically signed by: Alan Sanchez M.D., 10/13/2023 10:24 PM

## 2023-10-14 NOTE — ED NOTES
Discharge instructions given to pt. Pt verbalized understanding. All questions have been addressed. Discharged pt in stable condition.

## 2023-10-14 NOTE — ED TRIAGE NOTES
"Chief Complaint   Patient presents with    Leg Pain     Pt having excruciating R leg pain. Pt diagnosed with sciatica today here in ED. Pt numbness progressed to pain.      Pt amb with steady gait in triage. Pt refuses WC. Pt with . Pt educated to inform staff of any changes.     BP (!) 165/119 Comment: patient crying and shaking  Pulse 91   Temp 36.6 °C (97.8 °F) (Temporal)   Resp 20   Ht 1.6 m (5' 3\")   Wt 64.2 kg (141 lb 8.6 oz)   SpO2 99%   BMI 25.07 kg/m²     "

## 2023-10-14 NOTE — ED NOTES
Rounded on patient. She was sleeping in bed. She states that her symptoms have improved some due to the fact she was sleeping. Patient remains stable on RA. VSS.

## 2024-07-20 ENCOUNTER — APPOINTMENT (OUTPATIENT)
Dept: URGENT CARE | Facility: PHYSICIAN GROUP | Age: 39
End: 2024-07-20
Payer: COMMERCIAL

## 2024-08-15 ENCOUNTER — APPOINTMENT (OUTPATIENT)
Dept: RADIOLOGY | Facility: MEDICAL CENTER | Age: 39
End: 2024-08-15
Attending: OBSTETRICS & GYNECOLOGY
Payer: COMMERCIAL

## 2024-08-15 DIAGNOSIS — Z12.31 VISIT FOR SCREENING MAMMOGRAM: ICD-10-CM

## 2024-09-06 ENCOUNTER — APPOINTMENT (OUTPATIENT)
Dept: RADIOLOGY | Facility: MEDICAL CENTER | Age: 39
End: 2024-09-06
Attending: OBSTETRICS & GYNECOLOGY
Payer: COMMERCIAL